# Patient Record
Sex: MALE | Employment: PART TIME | URBAN - METROPOLITAN AREA
[De-identification: names, ages, dates, MRNs, and addresses within clinical notes are randomized per-mention and may not be internally consistent; named-entity substitution may affect disease eponyms.]

---

## 2017-11-11 ENCOUNTER — APPOINTMENT (OUTPATIENT)
Dept: GENERAL RADIOLOGY | Facility: CLINIC | Age: 62
End: 2017-11-11
Attending: EMERGENCY MEDICINE
Payer: COMMERCIAL

## 2017-11-11 ENCOUNTER — HOSPITAL ENCOUNTER (EMERGENCY)
Facility: CLINIC | Age: 62
Discharge: HOME | End: 2017-11-11
Attending: EMERGENCY MEDICINE | Admitting: EMERGENCY MEDICINE
Payer: COMMERCIAL

## 2017-11-11 VITALS
SYSTOLIC BLOOD PRESSURE: 119 MMHG | DIASTOLIC BLOOD PRESSURE: 79 MMHG | RESPIRATION RATE: 18 BRPM | BODY MASS INDEX: 24.8 KG/M2 | HEIGHT: 67 IN | OXYGEN SATURATION: 99 % | WEIGHT: 158 LBS | TEMPERATURE: 97.4 F

## 2017-11-11 DIAGNOSIS — R05.9 COUGH: ICD-10-CM

## 2017-11-11 LAB
ANION GAP SERPL CALCULATED.3IONS-SCNC: 6 MMOL/L (ref 3–14)
BASOPHILS # BLD AUTO: 0.1 10E9/L (ref 0–0.2)
BASOPHILS NFR BLD AUTO: 1.1 %
BUN SERPL-MCNC: 21 MG/DL (ref 7–30)
CALCIUM SERPL-MCNC: 9.3 MG/DL (ref 8.5–10.1)
CHLORIDE SERPL-SCNC: 105 MMOL/L (ref 94–109)
CO2 SERPL-SCNC: 28 MMOL/L (ref 20–32)
CREAT SERPL-MCNC: 0.79 MG/DL (ref 0.66–1.25)
DIFFERENTIAL METHOD BLD: NORMAL
EOSINOPHIL # BLD AUTO: 0.4 10E9/L (ref 0–0.7)
EOSINOPHIL NFR BLD AUTO: 4.8 %
ERYTHROCYTE [DISTWIDTH] IN BLOOD BY AUTOMATED COUNT: 12.8 % (ref 10–15)
GFR SERPL CREATININE-BSD FRML MDRD: >90 ML/MIN/1.7M2
GLUCOSE SERPL-MCNC: 88 MG/DL (ref 70–99)
HCT VFR BLD AUTO: 42.7 % (ref 40–53)
HGB BLD-MCNC: 14.1 G/DL (ref 13.3–17.7)
IMM GRANULOCYTES # BLD: 0 10E9/L (ref 0–0.4)
IMM GRANULOCYTES NFR BLD: 0.3 %
LYMPHOCYTES # BLD AUTO: 2.2 10E9/L (ref 0.8–5.3)
LYMPHOCYTES NFR BLD AUTO: 29.3 %
MCH RBC QN AUTO: 29.1 PG (ref 26.5–33)
MCHC RBC AUTO-ENTMCNC: 33 G/DL (ref 31.5–36.5)
MCV RBC AUTO: 88 FL (ref 78–100)
MONOCYTES # BLD AUTO: 0.7 10E9/L (ref 0–1.3)
MONOCYTES NFR BLD AUTO: 8.8 %
NEUTROPHILS # BLD AUTO: 4.1 10E9/L (ref 1.6–8.3)
NEUTROPHILS NFR BLD AUTO: 55.7 %
NRBC # BLD AUTO: 0 10*3/UL
NRBC BLD AUTO-RTO: 0 /100
PLATELET # BLD AUTO: 192 10E9/L (ref 150–450)
POTASSIUM SERPL-SCNC: 4.2 MMOL/L (ref 3.4–5.3)
RBC # BLD AUTO: 4.85 10E12/L (ref 4.4–5.9)
SODIUM SERPL-SCNC: 139 MMOL/L (ref 133–144)
WBC # BLD AUTO: 7.4 10E9/L (ref 4–11)

## 2017-11-11 PROCEDURE — 85025 COMPLETE CBC W/AUTO DIFF WBC: CPT | Performed by: EMERGENCY MEDICINE

## 2017-11-11 PROCEDURE — 99284 EMERGENCY DEPT VISIT MOD MDM: CPT | Mod: 25 | Performed by: EMERGENCY MEDICINE

## 2017-11-11 PROCEDURE — 99283 EMERGENCY DEPT VISIT LOW MDM: CPT | Mod: Z6 | Performed by: EMERGENCY MEDICINE

## 2017-11-11 PROCEDURE — 80048 BASIC METABOLIC PNL TOTAL CA: CPT | Performed by: EMERGENCY MEDICINE

## 2017-11-11 PROCEDURE — 71020 XR CHEST 2 VW: CPT

## 2017-11-11 RX ORDER — IPRATROPIUM BROMIDE AND ALBUTEROL SULFATE 2.5; .5 MG/3ML; MG/3ML
SOLUTION RESPIRATORY (INHALATION)
Status: DISCONTINUED
Start: 2017-11-11 | End: 2017-11-11 | Stop reason: HOSPADM

## 2017-11-11 ASSESSMENT — ENCOUNTER SYMPTOMS
ABDOMINAL PAIN: 0
CHILLS: 1
CHEST TIGHTNESS: 1
FEVER: 0
DIARRHEA: 0
VOMITING: 0
COUGH: 1

## 2017-11-11 NOTE — ED NOTES
"Pt reports productive cough for 1 month and tested positive for strep throat 3 weeks ago. Pt denies sore throat at this time. Pt took and finished Augmentin course 1 week ago and has felt like it has been worsening with pain/discomfort in his lungs with a \"pinching pain\" on bilateral sides and continued green sputum.   "

## 2017-11-11 NOTE — ED PROVIDER NOTES
History     Chief Complaint   Patient presents with     Cold Symptoms     Pt states he has had cough x 1 mth. Pt was seen in clinic and was given azithromycin and Guaiatussin AC. Pt states he is feeling worse and not getting any better. Coughing up green sputum. C/O fever and chills     HPI  Lei Dickens is a 62 year old male who presents for evaluation of cough. The patient reports that he's had a cough ongoing for the past 6 weeks. He states that he developed a sore throat and was seen in clinic, tested positive for strep, and sore throat improved with amoxicillin. However, cough has persisted, so he was again seen in clinic 3 weeks ago and provided a course of azithromycin and Robitussin, but he has not had improvement in cough with this. The patient reports cough is productive with dark green sputum. He endorses congestion and reports that he's now getting a pinching pain in both sides of his upper back. No fevers but he's had chills at night, and decreased sleep due to his symptoms. He denies any nasal congestion or runny nose. The patient denies a history of lung problem. Also no history of heart problem, diabetes, or any other significant medical ailment. No history of TB. He's usual quite healthy, exercises regularly, though this has been limited recently due to some new shortness of breath related to his symptoms. He states he's been eating/drinking okay recently. He's a nonsmoker, does not drink alcohol or use drugs. The patient had chest x-ray last about 6 weeks ago that was reportedly normal.    Current Facility-Administered Medications   Medication     ipratropium - albuterol 0.5 mg/2.5 mg/3 mL (DUONEB) 0.5-2.5 (3) MG/3ML neb solution     Current Outpatient Prescriptions   Medication     AMOXICILLIN PO     azithromycin (ZITHROMAX) 250 MG tablet     History reviewed. No pertinent past medical history.    History reviewed. No pertinent surgical history.    No family history on  "file.    Social History   Substance Use Topics     Smoking status: Never Smoker     Smokeless tobacco: Never Used     Alcohol use No      No Known Allergies    I have reviewed the Medications, Allergies, Past Medical and Surgical History, and Social History in the Epic system.    Review of Systems   Constitutional: Positive for chills. Negative for fever.   Respiratory: Positive for cough and chest tightness.    Cardiovascular: Negative for chest pain.   Gastrointestinal: Negative for abdominal pain, diarrhea and vomiting.   All other systems reviewed and are negative.      Physical Exam   BP: 129/84  Heart Rate: 99  Temp: 97.4  F (36.3  C)  Resp: 18  Height: 170.2 cm (5' 7\")  Weight: 71.7 kg (158 lb)  SpO2: 98 %      Physical Exam   Constitutional: He is oriented to person, place, and time. He appears well-developed and well-nourished. No distress.   HENT:   Head: Normocephalic and atraumatic.   Right Ear: External ear normal.   Left Ear: External ear normal.   Nose: Nose normal.   Mouth/Throat: Oropharynx is clear and moist.   Eyes: EOM are normal. Pupils are equal, round, and reactive to light.   Neck: Normal range of motion. Neck supple.   Cardiovascular: Normal rate, regular rhythm, normal heart sounds and intact distal pulses.    Pulmonary/Chest: Effort normal and breath sounds normal. No respiratory distress. He has no wheezes. He has no rales. He exhibits no tenderness.   Speaks in full sentences.     Abdominal: Soft. Bowel sounds are normal. He exhibits no distension and no mass. There is no tenderness. There is no rebound and no guarding.   Musculoskeletal: Normal range of motion.   Walks briskly without limitation.    Lymphadenopathy:     He has no cervical adenopathy.   Neurological: He is alert and oriented to person, place, and time.   Skin: Skin is warm and dry. No rash noted. He is not diaphoretic.   Psychiatric: He has a normal mood and affect.   Nursing note and vitals reviewed.      ED Course "     ED Course     Procedures           Labs Ordered and Resulted from Time of ED Arrival Up to the Time of Departure from the ED   CBC WITH PLATELETS DIFFERENTIAL   BASIC METABOLIC PANEL     Results for orders placed or performed during the hospital encounter of 11/11/17 (from the past 24 hour(s))   XR Chest 2 Views    Narrative    CHEST TWO VIEWS 11/11/2017 1:08 PM     HISTORY: Cough and fevers.      Impression    IMPRESSION: PA and lateral views of the chest. Lungs are clear. Heart  is normal in size. No effusions are evident. No pneumothorax.    ALCIDES LAY MD   CBC with platelets differential   Result Value Ref Range    WBC 7.4 4.0 - 11.0 10e9/L    RBC Count 4.85 4.4 - 5.9 10e12/L    Hemoglobin 14.1 13.3 - 17.7 g/dL    Hematocrit 42.7 40.0 - 53.0 %    MCV 88 78 - 100 fl    MCH 29.1 26.5 - 33.0 pg    MCHC 33.0 31.5 - 36.5 g/dL    RDW 12.8 10.0 - 15.0 %    Platelet Count 192 150 - 450 10e9/L    Diff Method Automated Method     % Neutrophils 55.7 %    % Lymphocytes 29.3 %    % Monocytes 8.8 %    % Eosinophils 4.8 %    % Basophils 1.1 %    % Immature Granulocytes 0.3 %    Nucleated RBCs 0 0 /100    Absolute Neutrophil 4.1 1.6 - 8.3 10e9/L    Absolute Lymphocytes 2.2 0.8 - 5.3 10e9/L    Absolute Monocytes 0.7 0.0 - 1.3 10e9/L    Absolute Eosinophils 0.4 0.0 - 0.7 10e9/L    Absolute Basophils 0.1 0.0 - 0.2 10e9/L    Abs Immature Granulocytes 0.0 0 - 0.4 10e9/L    Absolute Nucleated RBC 0.0    Basic metabolic panel   Result Value Ref Range    Sodium 139 133 - 144 mmol/L    Potassium 4.2 3.4 - 5.3 mmol/L    Chloride 105 94 - 109 mmol/L    Carbon Dioxide 28 20 - 32 mmol/L    Anion Gap 6 3 - 14 mmol/L    Glucose 88 70 - 99 mg/dL    Urea Nitrogen 21 7 - 30 mg/dL    Creatinine 0.79 0.66 - 1.25 mg/dL    GFR Estimate >90 >60 mL/min/1.7m2    GFR Estimate If Black >90 >60 mL/min/1.7m2    Calcium 9.3 8.5 - 10.1 mg/dL            Assessments & Plan (with Medical Decision Making)   The patient presents with cough for 6 weeks.  It  sounds like it started with sore throat that was positive for strep and treated with amoxicillin.  His sore throat improved but his cough persisted.  He was treated with zpak and cough meds but cough persists.  Today his sats are 98% on room air.  He appears well. Due to duration of symptoms, cxr was done and reviewed.  No infiltrate was noted.  CBC is normal today. PE is unlikely given the green phlegm he is coughing up.  He denies sinus drainage.  We discussed this is likely still viral and he should give it a few more weeks to improve.  He should f/u with his pmd in 2 weeks if not better.     I have reviewed the nursing notes.    I have reviewed the findings, diagnosis, plan and need for follow up with the patient.    New Prescriptions    No medications on file       Final diagnoses:   Cough     Robinson KING, am serving as a trained medical scribe to document services personally performed by Sammi Esquivel MD, based on the provider's statements to me.      Sammi KING MD, was physically present and have reviewed and verified the accuracy of this note documented by Robinson Babin.    11/11/2017   [facility], [place], EMERGENCY DEPARTMENT     Sammi Esquivel MD  11/11/17 2056

## 2017-11-11 NOTE — ED AVS SNAPSHOT
[facility], [place], Emergency Department    [address]    [place] [address]    Phone:  [phone]    Fax:  [phone]                                       Lei Dickens   MRN: [id number]    Department:  [facility], Emergency Department   Date of Visit:  11/11/2017           After Visit Summary Signature Page     I have received my discharge instructions, and my questions have been answered. I have discussed any challenges I see with this plan with the nurse or doctor.    ..........................................................................................................................................  Patient/Patient Representative Signature      ..........................................................................................................................................  Patient Representative Print Name and Relationship to Patient    ..................................................               ................................................  Date                                            Time    ..........................................................................................................................................  Reviewed by Signature/Title    ...................................................              ..............................................  Date                                                            Time

## 2017-11-11 NOTE — DISCHARGE INSTRUCTIONS
Continue using cough medicine if needed.     No pneumonia was seen on your xray today.     Please follow up with your primary care doctor in 2 weeks if still not better.     Today's result:  Results for orders placed or performed during the hospital encounter of 11/11/17 (from the past 24 hour(s))   XR Chest 2 Views    Narrative    CHEST TWO VIEWS 11/11/2017 1:08 PM     HISTORY: Cough and fevers.      Impression    IMPRESSION: PA and lateral views of the chest. Lungs are clear. Heart  is normal in size. No effusions are evident. No pneumothorax.    ALCIDES LAY MD   CBC with platelets differential   Result Value Ref Range    WBC 7.4 4.0 - 11.0 10e9/L    RBC Count 4.85 4.4 - 5.9 10e12/L    Hemoglobin 14.1 13.3 - 17.7 g/dL    Hematocrit 42.7 40.0 - 53.0 %    MCV 88 78 - 100 fl    MCH 29.1 26.5 - 33.0 pg    MCHC 33.0 31.5 - 36.5 g/dL    RDW 12.8 10.0 - 15.0 %    Platelet Count 192 150 - 450 10e9/L    Diff Method Automated Method     % Neutrophils 55.7 %    % Lymphocytes 29.3 %    % Monocytes 8.8 %    % Eosinophils 4.8 %    % Basophils 1.1 %    % Immature Granulocytes 0.3 %    Nucleated RBCs 0 0 /100    Absolute Neutrophil 4.1 1.6 - 8.3 10e9/L    Absolute Lymphocytes 2.2 0.8 - 5.3 10e9/L    Absolute Monocytes 0.7 0.0 - 1.3 10e9/L    Absolute Eosinophils 0.4 0.0 - 0.7 10e9/L    Absolute Basophils 0.1 0.0 - 0.2 10e9/L    Abs Immature Granulocytes 0.0 0 - 0.4 10e9/L    Absolute Nucleated RBC 0.0    Basic metabolic panel   Result Value Ref Range    Sodium 139 133 - 144 mmol/L    Potassium 4.2 3.4 - 5.3 mmol/L    Chloride 105 94 - 109 mmol/L    Carbon Dioxide 28 20 - 32 mmol/L    Anion Gap 6 3 - 14 mmol/L    Glucose 88 70 - 99 mg/dL    Urea Nitrogen 21 7 - 30 mg/dL    Creatinine 0.79 0.66 - 1.25 mg/dL    GFR Estimate >90 >60 mL/min/1.7m2    GFR Estimate If Black >90 >60 mL/min/1.7m2    Calcium 9.3 8.5 - 10.1 mg/dL

## 2017-11-11 NOTE — ED AVS SNAPSHOT
[facility], Emergency Department    [address]    [place] [address]    Phone:  [phone]    Fax:  [phone]                                       Lei Dickens   MRN: [id number]    Department:  [facility], Emergency Department   Date of Visit:  11/11/2017           Patient Information     Date Of Birth          1955        Your diagnoses for this visit were:     Cough        You were seen by Sammi Esquivel MD.        Discharge Instructions       Continue using cough medicine if needed.     No pneumonia was seen on your xray today.     Please follow up with your primary care doctor in 2 weeks if still not better.     Today's result:  Results for orders placed or performed during the hospital encounter of 11/11/17 (from the past 24 hour(s))   XR Chest 2 Views    Narrative    CHEST TWO VIEWS 11/11/2017 1:08 PM     HISTORY: Cough and fevers.      Impression    IMPRESSION: PA and lateral views of the chest. Lungs are clear. Heart  is normal in size. No effusions are evident. No pneumothorax.    ALCIDES LAY MD   CBC with platelets differential   Result Value Ref Range    WBC 7.4 4.0 - 11.0 10e9/L    RBC Count 4.85 4.4 - 5.9 10e12/L    Hemoglobin 14.1 13.3 - 17.7 g/dL    Hematocrit 42.7 40.0 - 53.0 %    MCV 88 78 - 100 fl    MCH 29.1 26.5 - 33.0 pg    MCHC 33.0 31.5 - 36.5 g/dL    RDW 12.8 10.0 - 15.0 %    Platelet Count 192 150 - 450 10e9/L    Diff Method Automated Method     % Neutrophils 55.7 %    % Lymphocytes 29.3 %    % Monocytes 8.8 %    % Eosinophils 4.8 %    % Basophils 1.1 %    % Immature Granulocytes 0.3 %    Nucleated RBCs 0 0 /100    Absolute Neutrophil 4.1 1.6 - 8.3 10e9/L    Absolute Lymphocytes 2.2 0.8 - 5.3 10e9/L    Absolute Monocytes 0.7 0.0 - 1.3 10e9/L    Absolute Eosinophils 0.4 0.0 - 0.7 10e9/L    Absolute Basophils 0.1 0.0 - 0.2 10e9/L    Abs Immature Granulocytes 0.0 0 - 0.4 10e9/L    Absolute Nucleated RBC 0.0    Basic metabolic panel   Result Value Ref  Range    Sodium 139 133 - 144 mmol/L    Potassium 4.2 3.4 - 5.3 mmol/L    Chloride 105 94 - 109 mmol/L    Carbon Dioxide 28 20 - 32 mmol/L    Anion Gap 6 3 - 14 mmol/L    Glucose 88 70 - 99 mg/dL    Urea Nitrogen 21 7 - 30 mg/dL    Creatinine 0.79 0.66 - 1.25 mg/dL    GFR Estimate >90 >60 mL/min/1.7m2    GFR Estimate If Black >90 >60 mL/min/1.7m2    Calcium 9.3 8.5 - 10.1 mg/dL         24 Hour Appointment Hotline       To make an appointment at any [facility] clinic, call [phone] ([phone]). If you don't have a family doctor or clinic, we will help you find one. [facility] clinics are conveniently located to serve the needs of you and your family.             Review of your medicines      Our records show that you are taking the medicines listed below. If these are incorrect, please call your family doctor or clinic.        Dose / Directions Last dose taken    AMOXICILLIN PO        Refills:  0        azithromycin 250 MG tablet   Commonly known as:  ZITHROMAX   Quantity:  6 tablet        Two tablets first day, then one tablet daily for four days   Refills:  0                Procedures and tests performed during your visit     Basic metabolic panel    CBC with platelets differential    XR Chest 2 Views      Orders Needing Specimen Collection     None      Pending Results     No orders found from 11/9/2017 to 11/12/2017.            Pending Culture Results     No orders found from 11/9/2017 to 11/12/2017.            Pending Results Instructions     If you had any lab results that were not finalized at the time of your Discharge, you can call the ED Lab Result RN at [phone]. You will be contacted by this team for any positive Lab results or changes in treatment. The nurses are available 7 days a week from 10A to 6:30P.  You can leave a message 24 hours per day and they will return your call.        Thank you for choosing [facility]       Thank you for choosing [facility] for your care. Our goal is always to  "provide you with excellent care. Hearing back from our patients is one way we can continue to improve our services. Please take a few minutes to complete the written survey that you may receive in the mail after you visit with us. Thank you!        [employer] Information     [employer] lets you send messages to your doctor, view your test results, renew your prescriptions, schedule appointments and more. To sign up, go to [email or web address] . Click on \"Log in\" on the left side of the screen, which will take you to the Welcome page. Then click on \"Sign up Now\" on the right side of the page.     You will be asked to enter the access code listed below, as well as some personal information. Please follow the directions to create your username and password.     Your access code is: [email or web address]  Expires: 2018  2:50 PM     Your access code will  in 90 days. If you need help or a new code, please call your [facility] clinic or [phone].        Care EveryWhere ID     This is your Care EveryWhere ID. This could be used by other organizations to access your [facility] medical records  [id number]        Equal Access to Services     [place] AH: Hadii mo Tenorio, watrinityda rafa, qaybta kaalmada hanh, rhianna parikh. So [facility] [phone].    ATENCIÓN: Si habla español, tiene a soler disposición servicios gratuitos de asistencia lingüística. Tamara al [phone].    We comply with applicable federal civil rights laws and [place] laws. We do not discriminate on the basis of race, color, national origin, age, disability, sex, sexual orientation, or gender identity.            After Visit Summary       This is your record. Keep this with you and show to your community pharmacist(s) and doctor(s) at your next visit.                  "

## 2020-01-11 ENCOUNTER — HOSPITAL ENCOUNTER (OUTPATIENT)
Facility: CLINIC | Age: 65
Setting detail: OBSERVATION
Discharge: HOME | End: 2020-01-12
Attending: EMERGENCY MEDICINE | Admitting: EMERGENCY MEDICINE
Payer: COMMERCIAL

## 2020-01-11 DIAGNOSIS — R07.9 CHEST PAIN, UNSPECIFIED TYPE: ICD-10-CM

## 2020-01-11 DIAGNOSIS — E78.5 HYPERLIPIDEMIA, UNSPECIFIED HYPERLIPIDEMIA TYPE: ICD-10-CM

## 2020-01-11 DIAGNOSIS — I10 ESSENTIAL HYPERTENSION, MALIGNANT: ICD-10-CM

## 2020-01-11 DIAGNOSIS — E11.9 TYPE 2 DIABETES MELLITUS WITHOUT COMPLICATION, UNSPECIFIED WHETHER LONG TERM INSULIN USE (H): ICD-10-CM

## 2020-01-11 LAB
ALBUMIN SERPL-MCNC: 4 G/DL (ref 3.4–5)
ALP SERPL-CCNC: 81 U/L (ref 40–150)
ALT SERPL W P-5'-P-CCNC: 34 U/L (ref 0–70)
ANION GAP SERPL CALCULATED.3IONS-SCNC: 8 MMOL/L (ref 3–14)
AST SERPL W P-5'-P-CCNC: 28 U/L (ref 0–45)
BASOPHILS # BLD AUTO: 0.1 10E9/L (ref 0–0.2)
BASOPHILS NFR BLD AUTO: 0.7 %
BILIRUB SERPL-MCNC: 0.6 MG/DL (ref 0.2–1.3)
BUN SERPL-MCNC: 16 MG/DL (ref 7–30)
CALCIUM SERPL-MCNC: 8 MG/DL (ref 8.5–10.1)
CHLORIDE SERPL-SCNC: 105 MMOL/L (ref 94–109)
CO2 SERPL-SCNC: 28 MMOL/L (ref 20–32)
CREAT SERPL-MCNC: 0.73 MG/DL (ref 0.66–1.25)
DIFFERENTIAL METHOD BLD: NORMAL
EOSINOPHIL # BLD AUTO: 0.4 10E9/L (ref 0–0.7)
EOSINOPHIL NFR BLD AUTO: 6 %
ERYTHROCYTE [DISTWIDTH] IN BLOOD BY AUTOMATED COUNT: 14 % (ref 10–15)
GFR SERPL CREATININE-BSD FRML MDRD: >90 ML/MIN/{1.73_M2}
GLUCOSE SERPL-MCNC: 126 MG/DL (ref 70–99)
HCT VFR BLD AUTO: 41.8 % (ref 40–53)
HGB BLD-MCNC: 14.3 G/DL (ref 13.3–17.7)
IMM GRANULOCYTES # BLD: 0 10E9/L (ref 0–0.4)
IMM GRANULOCYTES NFR BLD: 0.4 %
LYMPHOCYTES # BLD AUTO: 2.5 10E9/L (ref 0.8–5.3)
LYMPHOCYTES NFR BLD AUTO: 33.9 %
MCH RBC QN AUTO: 30 PG (ref 26.5–33)
MCHC RBC AUTO-ENTMCNC: 34.2 G/DL (ref 31.5–36.5)
MCV RBC AUTO: 88 FL (ref 78–100)
MONOCYTES # BLD AUTO: 0.6 10E9/L (ref 0–1.3)
MONOCYTES NFR BLD AUTO: 8 %
NEUTROPHILS # BLD AUTO: 3.7 10E9/L (ref 1.6–8.3)
NEUTROPHILS NFR BLD AUTO: 51 %
NRBC # BLD AUTO: 0 10*3/UL
NRBC BLD AUTO-RTO: 0 /100
PLATELET # BLD AUTO: 177 10E9/L (ref 150–450)
POTASSIUM SERPL-SCNC: 3.5 MMOL/L (ref 3.4–5.3)
PROT SERPL-MCNC: 7.8 G/DL (ref 6.8–8.8)
RBC # BLD AUTO: 4.77 10E12/L (ref 4.4–5.9)
SODIUM SERPL-SCNC: 141 MMOL/L (ref 133–144)
TROPONIN I SERPL-MCNC: <0.015 UG/L (ref 0–0.04)
WBC # BLD AUTO: 7.2 10E9/L (ref 4–11)

## 2020-01-11 PROCEDURE — 80053 COMPREHEN METABOLIC PANEL: CPT | Performed by: EMERGENCY MEDICINE

## 2020-01-11 PROCEDURE — 99285 EMERGENCY DEPT VISIT HI MDM: CPT | Mod: 25 | Performed by: EMERGENCY MEDICINE

## 2020-01-11 PROCEDURE — 85025 COMPLETE CBC W/AUTO DIFF WBC: CPT | Performed by: EMERGENCY MEDICINE

## 2020-01-11 PROCEDURE — 93005 ELECTROCARDIOGRAM TRACING: CPT | Performed by: EMERGENCY MEDICINE

## 2020-01-11 PROCEDURE — 93010 ELECTROCARDIOGRAM REPORT: CPT | Mod: Z6 | Performed by: EMERGENCY MEDICINE

## 2020-01-11 PROCEDURE — 84484 ASSAY OF TROPONIN QUANT: CPT | Performed by: EMERGENCY MEDICINE

## 2020-01-11 RX ORDER — MELATONIN 5 MG
5 TABLET,CHEWABLE ORAL PRN
COMMUNITY
Start: 2020-01-09

## 2020-01-11 RX ORDER — ATORVASTATIN CALCIUM 20 MG/1
20 TABLET, FILM COATED ORAL DAILY
COMMUNITY
Start: 2020-01-09 | End: 2021-01-08

## 2020-01-11 RX ORDER — LISINOPRIL 10 MG/1
10 TABLET ORAL DAILY
COMMUNITY
Start: 2020-01-10 | End: 2021-01-09

## 2020-01-11 RX ORDER — METFORMIN HCL 500 MG
500 TABLET, EXTENDED RELEASE 24 HR ORAL DAILY
COMMUNITY
Start: 2020-01-09 | End: 2021-01-08

## 2020-01-11 ASSESSMENT — ENCOUNTER SYMPTOMS
HYPERTENSION: 1
HEADACHES: 1

## 2020-01-12 ENCOUNTER — APPOINTMENT (OUTPATIENT)
Dept: GENERAL RADIOLOGY | Facility: CLINIC | Age: 65
End: 2020-01-12
Attending: EMERGENCY MEDICINE
Payer: COMMERCIAL

## 2020-01-12 ENCOUNTER — APPOINTMENT (OUTPATIENT)
Dept: CARDIOLOGY | Facility: CLINIC | Age: 65
End: 2020-01-12
Attending: NURSE PRACTITIONER
Payer: COMMERCIAL

## 2020-01-12 VITALS
OXYGEN SATURATION: 99 % | BODY MASS INDEX: 26.66 KG/M2 | RESPIRATION RATE: 16 BRPM | HEART RATE: 57 BPM | WEIGHT: 170.19 LBS | DIASTOLIC BLOOD PRESSURE: 94 MMHG | SYSTOLIC BLOOD PRESSURE: 140 MMHG | TEMPERATURE: 98.1 F

## 2020-01-12 LAB
GLUCOSE BLDC GLUCOMTR-MCNC: 105 MG/DL (ref 70–99)
GLUCOSE BLDC GLUCOMTR-MCNC: 107 MG/DL (ref 70–99)
TROPONIN I SERPL-MCNC: <0.015 UG/L (ref 0–0.04)
TROPONIN I SERPL-MCNC: <0.015 UG/L (ref 0–0.04)

## 2020-01-12 PROCEDURE — G0378 HOSPITAL OBSERVATION PER HR: HCPCS

## 2020-01-12 PROCEDURE — 25000128 H RX IP 250 OP 636: Performed by: EMERGENCY MEDICINE

## 2020-01-12 PROCEDURE — 36415 COLL VENOUS BLD VENIPUNCTURE: CPT | Performed by: NURSE PRACTITIONER

## 2020-01-12 PROCEDURE — 93321 DOPPLER ECHO F-UP/LMTD STD: CPT | Mod: 26 | Performed by: INTERNAL MEDICINE

## 2020-01-12 PROCEDURE — 00000146 ZZHCL STATISTIC GLUCOSE BY METER IP

## 2020-01-12 PROCEDURE — 93016 CV STRESS TEST SUPVJ ONLY: CPT | Performed by: INTERNAL MEDICINE

## 2020-01-12 PROCEDURE — 25500064 ZZH RX 255 OP 636: Performed by: EMERGENCY MEDICINE

## 2020-01-12 PROCEDURE — 99220 ZZC INITIAL OBSERVATION CARE,LEVL III: CPT | Mod: Z6 | Performed by: NURSE PRACTITIONER

## 2020-01-12 PROCEDURE — 84484 ASSAY OF TROPONIN QUANT: CPT | Mod: 91 | Performed by: NURSE PRACTITIONER

## 2020-01-12 PROCEDURE — 93350 STRESS TTE ONLY: CPT | Mod: 26 | Performed by: INTERNAL MEDICINE

## 2020-01-12 PROCEDURE — 40000802 ZZH SITE CHECK

## 2020-01-12 PROCEDURE — 71046 X-RAY EXAM CHEST 2 VIEWS: CPT

## 2020-01-12 PROCEDURE — 25000132 ZZH RX MED GY IP 250 OP 250 PS 637: Performed by: NURSE PRACTITIONER

## 2020-01-12 PROCEDURE — 25000125 ZZHC RX 250: Performed by: EMERGENCY MEDICINE

## 2020-01-12 PROCEDURE — 93018 CV STRESS TEST I&R ONLY: CPT | Performed by: INTERNAL MEDICINE

## 2020-01-12 PROCEDURE — 93325 DOPPLER ECHO COLOR FLOW MAPG: CPT | Mod: 26 | Performed by: INTERNAL MEDICINE

## 2020-01-12 PROCEDURE — 93325 DOPPLER ECHO COLOR FLOW MAPG: CPT | Mod: TC

## 2020-01-12 RX ORDER — ALUMINA, MAGNESIA, AND SIMETHICONE 2400; 2400; 240 MG/30ML; MG/30ML; MG/30ML
30 SUSPENSION ORAL EVERY 4 HOURS PRN
Status: DISCONTINUED | OUTPATIENT
Start: 2020-01-12 | End: 2020-01-12 | Stop reason: HOSPADM

## 2020-01-12 RX ORDER — NALOXONE HYDROCHLORIDE 0.4 MG/ML
.1-.4 INJECTION, SOLUTION INTRAMUSCULAR; INTRAVENOUS; SUBCUTANEOUS
Status: DISCONTINUED | OUTPATIENT
Start: 2020-01-12 | End: 2020-01-12 | Stop reason: HOSPADM

## 2020-01-12 RX ORDER — DOBUTAMINE HYDROCHLORIDE 200 MG/100ML
10-50 INJECTION INTRAVENOUS CONTINUOUS
Status: DISPENSED | OUTPATIENT
Start: 2020-01-12 | End: 2020-01-12

## 2020-01-12 RX ORDER — NITROGLYCERIN 0.4 MG/1
0.4 TABLET SUBLINGUAL EVERY 5 MIN PRN
Status: DISCONTINUED | OUTPATIENT
Start: 2020-01-12 | End: 2020-01-12 | Stop reason: HOSPADM

## 2020-01-12 RX ORDER — ACETAMINOPHEN 650 MG/1
650 SUPPOSITORY RECTAL EVERY 4 HOURS PRN
Status: DISCONTINUED | OUTPATIENT
Start: 2020-01-12 | End: 2020-01-12 | Stop reason: HOSPADM

## 2020-01-12 RX ORDER — ATROPINE SULFATE 0.4 MG/ML
.2-2 AMPUL (ML) INJECTION
Status: COMPLETED | OUTPATIENT
Start: 2020-01-12 | End: 2020-01-12

## 2020-01-12 RX ORDER — ATORVASTATIN CALCIUM 20 MG/1
20 TABLET, FILM COATED ORAL DAILY
Status: DISCONTINUED | OUTPATIENT
Start: 2020-01-12 | End: 2020-01-12 | Stop reason: HOSPADM

## 2020-01-12 RX ORDER — ACETAMINOPHEN 325 MG/1
650 TABLET ORAL EVERY 4 HOURS PRN
Status: DISCONTINUED | OUTPATIENT
Start: 2020-01-12 | End: 2020-01-12 | Stop reason: HOSPADM

## 2020-01-12 RX ORDER — METOPROLOL TARTRATE 1 MG/ML
1-20 INJECTION, SOLUTION INTRAVENOUS
Status: ACTIVE | OUTPATIENT
Start: 2020-01-12 | End: 2020-01-12

## 2020-01-12 RX ORDER — SODIUM CHLORIDE 9 MG/ML
INJECTION, SOLUTION INTRAVENOUS CONTINUOUS
Status: ACTIVE | OUTPATIENT
Start: 2020-01-12 | End: 2020-01-12

## 2020-01-12 RX ORDER — LISINOPRIL 10 MG/1
10 TABLET ORAL DAILY
Status: DISCONTINUED | OUTPATIENT
Start: 2020-01-12 | End: 2020-01-12 | Stop reason: HOSPADM

## 2020-01-12 RX ORDER — DEXTROSE MONOHYDRATE 25 G/50ML
25-50 INJECTION, SOLUTION INTRAVENOUS
Status: DISCONTINUED | OUTPATIENT
Start: 2020-01-12 | End: 2020-01-12 | Stop reason: HOSPADM

## 2020-01-12 RX ORDER — NICOTINE POLACRILEX 4 MG
15-30 LOZENGE BUCCAL
Status: DISCONTINUED | OUTPATIENT
Start: 2020-01-12 | End: 2020-01-12 | Stop reason: HOSPADM

## 2020-01-12 RX ORDER — ASPIRIN 81 MG/1
162 TABLET, CHEWABLE ORAL ONCE
Status: COMPLETED | OUTPATIENT
Start: 2020-01-12 | End: 2020-01-12

## 2020-01-12 RX ORDER — ASPIRIN 81 MG/1
81 TABLET ORAL DAILY
Status: DISCONTINUED | OUTPATIENT
Start: 2020-01-12 | End: 2020-01-12 | Stop reason: HOSPADM

## 2020-01-12 RX ORDER — HYDRALAZINE HYDROCHLORIDE 20 MG/ML
5 INJECTION INTRAMUSCULAR; INTRAVENOUS EVERY 4 HOURS PRN
Status: DISCONTINUED | OUTPATIENT
Start: 2020-01-12 | End: 2020-01-12 | Stop reason: HOSPADM

## 2020-01-12 RX ORDER — LIDOCAINE 40 MG/G
CREAM TOPICAL
Status: DISCONTINUED | OUTPATIENT
Start: 2020-01-12 | End: 2020-01-12 | Stop reason: HOSPADM

## 2020-01-12 RX ADMIN — ASPIRIN 81 MG: 81 TABLET, COATED ORAL at 08:18

## 2020-01-12 RX ADMIN — LISINOPRIL 10 MG: 10 TABLET ORAL at 08:18

## 2020-01-12 RX ADMIN — PERFLUTREN 9 ML: 6.52 INJECTION, SUSPENSION INTRAVENOUS at 11:23

## 2020-01-12 RX ADMIN — ATROPINE SULFATE 0.4 MG: 0.4 INJECTION, SOLUTION INTRAMUSCULAR; INTRAVENOUS; SUBCUTANEOUS at 11:16

## 2020-01-12 RX ADMIN — METOPROLOL TARTRATE 3 MG: 5 INJECTION INTRAVENOUS at 11:20

## 2020-01-12 RX ADMIN — DOBUTAMINE HYDROCHLORIDE 10 MCG/KG/MIN: 200 INJECTION INTRAVENOUS at 11:08

## 2020-01-12 RX ADMIN — ASPIRIN 81 MG CHEWABLE TABLET 162 MG: 81 TABLET CHEWABLE at 03:41

## 2020-01-12 RX ADMIN — ATORVASTATIN CALCIUM 20 MG: 20 TABLET, FILM COATED ORAL at 08:18

## 2020-01-12 NOTE — ED PROVIDER NOTES
"    [facility] EMERGENCY DEPARTMENT ([facility])     January 11, 2020  History     Chief Complaint   Patient presents with     Hypertension     has been to doctor for high blood pressure, today it is really bad. He has had some chest pain with this high blood pressure and today his chest pain seemed a bit worse.      The history is provided by the patient and medical records.   Hypertension   Associated symptoms: chest pain (Sharp) and headaches      Lei Dickens is a 64 year old male with a past medical history notable for hypertension, hypercholesterolemia, and prediabetes who presents to the Emergency Department for complaints of chest pain and high blood pressure.  Patient states he was seen by his PCP earlier this week where he was told that he has high blood pressure and high cholesterol.  States he was prescribed BP medication, high cholesterol medication, and prediabetes medication.  Today, patient states he took his BP medication this morning at 8 AM noting, his BP was at 140. He reports of checking his BP throughout the day about 10 times where it has been increasing.  States when he last checked his BP today, it was at 180/100 which was worse than is ever been.    Patient reports of having chest pain earlier today as well with quality of pain as \"sharp\".  Per patient, states his chest pain has subsided and denies pain here in the ED.  States when he had chest pain, it lasted about 3 to 4 minutes.  He endorses having headache as well.  He denies history of stress test.    Past Medical History:   Diagnosis Date     Hypercholesterolemia      Hypertension      Pre-diabetes      History reviewed. No pertinent surgical history.    No family history on file.    Social History     Tobacco Use     Smoking status: Never Smoker     Smokeless tobacco: Never Used   Substance Use Topics     Alcohol use: No     No current facility-administered medications for this encounter.      Current Outpatient " Medications   Medication     atorvastatin (LIPITOR) 20 MG tablet     lisinopril (PRINIVIL/ZESTRIL) 10 MG tablet     magnesium hydroxide (MILK OF MAGNESIA) 400 MG/5ML suspension     Melatonin 5 MG CHEW     metFORMIN (GLUCOPHAGE-XR) 500 MG 24 hr tablet      No Known Allergies    I have reviewed the Medications, Allergies, Past Medical and Surgical History, and Social History in the Epic system.    Review of Systems   Cardiovascular: Positive for chest pain (Sharp).   Neurological: Positive for headaches.   All other systems reviewed and are negative.      Physical Exam   BP: (!) 180/95  Pulse: 66  Heart Rate: 62  Temp: 96.7  F (35.9  C)  Resp: 16  Weight: 78.5 kg (173 lb)  SpO2: 100 %      Physical Exam  Vitals signs and nursing note reviewed.   Constitutional:       General: He is not in acute distress.     Appearance: He is well-developed. He is not ill-appearing, toxic-appearing or diaphoretic.   HENT:      Head: Normocephalic and atraumatic.      Mouth/Throat:      Lips: Pink.      Mouth: Mucous membranes are moist.      Pharynx: Oropharynx is clear. No oropharyngeal exudate.   Eyes:      General: Lids are normal. No scleral icterus.     Extraocular Movements: Extraocular movements intact.      Right eye: No nystagmus.      Left eye: No nystagmus.      Conjunctiva/sclera: Conjunctivae normal.      Pupils: Pupils are equal, round, and reactive to light.   Neck:      Musculoskeletal: Normal range of motion and neck supple. No erythema or neck rigidity.      Thyroid: No thyromegaly.      Vascular: No JVD.      Trachea: No tracheal deviation.   Cardiovascular:      Rate and Rhythm: Normal rate and regular rhythm.      Pulses: Normal pulses.      Heart sounds: Normal heart sounds. No murmur. No friction rub. No gallop.    Pulmonary:      Effort: Pulmonary effort is normal. No respiratory distress.      Breath sounds: Normal breath sounds.   Abdominal:      General: Bowel sounds are normal. There is no distension.       Palpations: Abdomen is soft. There is no mass.      Tenderness: There is no abdominal tenderness. There is no guarding or rebound.   Musculoskeletal: Normal range of motion.         General: No tenderness.      Right lower leg: No edema.      Left lower leg: No edema.   Lymphadenopathy:      Cervical: No cervical adenopathy.   Skin:     General: Skin is warm and dry.      Capillary Refill: Capillary refill takes less than 2 seconds.      Coloration: Skin is not pale.      Findings: No erythema or rash.   Neurological:      Mental Status: He is alert and oriented to person, place, and time.      Cranial Nerves: No cranial nerve deficit.      Sensory: No sensory deficit.      Motor: Motor function is intact.   Psychiatric:         Mood and Affect: Mood and affect normal.         Speech: Speech normal.         Behavior: Behavior normal.         ED Course   10:53 PM  The patient was seen and examined by Moise Rivas MD, in Room ED02.      Procedures             EKG Interpretation:      Interpreted by Moise Rivas MD    Symptoms at time of EKG: chest pain   Rhythm: normal sinus   Rate: normal  Axis: normal  Ectopy: none  Conduction: normal  ST Segments/ T Waves: No ST-T wave changes  Q Waves: none  Comparison to prior: No old EKG available    Clinical Impression: normal EKG          Labs Ordered and Resulted from Time of ED Arrival Up to the Time of Departure from the ED   COMPREHENSIVE METABOLIC PANEL - Abnormal; Notable for the following components:       Result Value    Glucose 126 (*)     Calcium 8.0 (*)     All other components within normal limits   CBC WITH PLATELETS DIFFERENTIAL   TROPONIN I   PERIPHERAL IV CATHETER     Dobutamine Stress Echocardiogram   Final Result      XR Chest 2 Views   Final Result   IMPRESSION: No evidence of active cardiopulmonary disease.                Assessments & Plan (with Medical Decision Making)   This patient presented to the Emergency Department complaining of  high blood pressure as well as intermittent chest pains for the past couple days.  He states his pain was most severe today at which point he also noticed that his blood pressure was quite elevated in the 180s systolic.  At present, patient reports that he has minimal pain if any.  His twelve-lead EKG demonstrates no evidence of acute ischemia.  His initial troponin is negative and chest x-ray showed no signs of congestive heart failure, cardiomegaly or other acute abnormality.  I have no clinical suspicion of pulmonary embolism given his vacillating symptoms, lack of hypoxia, and lack of other risk factors.  He does have some classic cardiac risk factors and has not had formal cardiac stress testing so I do feel that given his symptom constellation and risk factor profile he would benefit from a chest pain observation stay.  This was discussed with the patient and he was in agreement with the plan.  I spoke with the NEREYDA in the observation unit and the patient will be admitted to the observation unit for chest pain rule out protocol.    This part of the medical record was transcribed by Ant Nuno Medical Scribe, from a dictation done by Moise Rivas MD.     I have reviewed the nursing notes.    I have reviewed the findings, diagnosis, plan and need for follow up with the patient.    Discharge Medication List as of 1/12/2020 12:50 PM          Final diagnoses:   Chest pain, unspecified type   I, Ant Nuno am serving as a trained medical scribe to document services personally performed by Moise Rivas MD, based on the provider's statements to me.      I, Moise Rivas MD, was physically present and have reviewed and verified the accuracy of this note documented by Ant Nuno.     1/11/2020   [facility], [place], EMERGENCY DEPARTMENT     Moise Rivas MD  01/12/20 1934

## 2020-01-12 NOTE — H&P
[facility], [place]    History and Physical - ED Observation       Date of Admission:  1/11/2020    Assessment & Plan   Lei Dickens is a 64 year old male with a past medical history notable for hypertension, hypercholesterolemia, and prediabetes who presents to the Emergency Department for complaints of chest pain and high blood pressure.     #Chest pain  #Hypertension  Pt reports intermittent chest pain for the last few days. The chest pain is sharp and lasts for 3-4 minutes at a time. He also notes that his blood pressure has been up all day today, at one point as high as 180 systolic. He decided to present to the Emergency Dept. He is not short of breath or hypoxic. He denies diaphoresis, or nausea/vomiting. In the ED, /95, HR 66, temp 96.7, RR 16, SPO2 100% on RA. Labs show Na 141, K+ 3.5, Cr 0.73, BUN 16. Troponin negative. Glucose 126. WBC 7.2, Hgb 14.3, hematocrit 41.8. Chest xray shows No evidence of active cardiopulmonary disease. EKG is non-ischemic although there is a small Q wave in Lead III and aVF. Chest pain resolved in the ED. Patient has multiple cardiac risk factors, including uncontrolled hypertension, hyperlipidemia, and diabetes. Pt has never undergone a stress test. Plan: Admit to ED Observation for ACS rule out.   -[place] to ED Observation  -VS Q4hrs  -Continuous cardiac monitoring  -Troponin x2 more  -Dobutamine stress test    #DM2: Pt reports he took metformin for a while then his Hgb A1c was 5.1 so his primary care provider let him stop taking it. But his most recent visit in the last week revealed that his Hgb A1c is up above 6 again. He was restarted on metformin. Hold for now as pt is NPO. Glucose checks and MSSI ordered.  #HTN: continue PTA lisinopril  #HLD: continue PTA simvastatin     Diet: Combination Diet Clear Liquid; No Caffeine Diet    DVT Prophylaxis: Low Risk/Ambulatory with no VTE prophylaxis indicated  Santos Catheter: not  "present  Code Status: Full Code      Disposition Plan   Expected discharge: Today, recommended to prior living arrangement once cardiac workup complete.  Entered: Akosua Caban CNP 01/12/2020, 3:28 AM       Akosua Caban CNP  [facility], [place]  ED Observation, 6D  Ascom:[id number]  ______________________________________________________________________    Chief Complaint   Chest pain, high blood pressure    History is obtained from the patient  And review of the medical record    History of Present Illness   Per ED,\"Lei Dickens is a 64 year old male with a past medical history notable for hypertension, hypercholesterolemia, and prediabetes who presents to the Emergency Department for complaints of chest pain and high blood pressure.  Patient states he was seen by his PCP earlier this week where he was told that he has high blood pressure and high cholesterol.  States he was prescribed BP medication, high cholesterol medication, and prediabetes medication.  Today, patient states he took his BP medication this morning at 8 AM noting, his BP was at 140. He reports of checking his BP throughout the day about 10 times where it has been increasing.  States when he last checked his BP today, it was at 180/100 which was worse than is ever been.    Patient reports of having chest pain earlier today as well with quality of pain as \"sharp\".  Per patient, states his chest pain has subsided and denies pain here in the ED.  States when he had chest pain, it lasted about 3 to 4 minutes.  He endorses having headache as well.  He denies history of stress test.\"    Review of Systems    Cardiovascular: Positive for chest pain (Sharp).   Neurological: Positive for headaches.   All other systems reviewed and are negative.    Past Medical History    I have reviewed this patient's medical history and updated it with pertinent information if needed.   Past Medical History:   Diagnosis Date     " Hypercholesterolemia      Hypertension      Pre-diabetes        Past Surgical History   I have reviewed this patient's surgical history and updated it with pertinent information if needed.  History reviewed. No pertinent surgical history.    Social History   I have reviewed this patient's social history and updated it with pertinent information if needed.  Social History     Tobacco Use     Smoking status: Never Smoker     Smokeless tobacco: Never Used   Substance Use Topics     Alcohol use: No     Drug use: No       Family History   I have reviewed this patient's family history and updated it with pertinent information if needed.   No family history on file.    Prior to Admission Medications   Prior to Admission Medications   Prescriptions Last Dose Informant Patient Reported? Taking?   Melatonin 5 MG CHEW Past Week at Unknown time  Yes Yes   Si mg by Oral or Feeding Tube route as needed   atorvastatin (LIPITOR) 20 MG tablet 2020 at Unknown time  Yes Yes   Sig: Take 20 mg by mouth daily   lisinopril (PRINIVIL/ZESTRIL) 10 MG tablet 2020 at Unknown time  Yes Yes   Sig: Take 10 mg by mouth daily   magnesium hydroxide (MILK OF MAGNESIA) 400 MG/5ML suspension   Yes Yes   Sig: Take 15 mLs by mouth daily   metFORMIN (GLUCOPHAGE-XR) 500 MG 24 hr tablet 2020 at Unknown time  Yes Yes   Sig: Take 500 mg by mouth daily      Facility-Administered Medications: None     Allergies   No Known Allergies    Physical Exam   Vital Signs: Temp: 98  F (36.7  C) Temp src: Oral BP: (!) 148/96 Pulse: 59 Heart Rate: 59 Resp: 16 SpO2: 98 % O2 Device: None (Room air)    Weight: 170 lbs 3.12 oz    Constitutional: awake, alert, cooperative, no apparent distress, and appears stated age  Eyes: Lids and lashes normal, pupils equal, round and reactive to light, extra ocular muscles intact, sclera clear, conjunctiva normal  ENT: Normocephalic, atraumatic,  external ears without lesions, oral pharynx with moist mucous membranes,  gums normal and good dentition.  Respiratory: Clear to auscultation bilaterally, no crackles or wheezing  Cardiovascular: Regular rate and rhythm, normal S1 and S2, no S3 or S4, and no murmur noted  Abdomen: Normal bowel sounds, soft, non-distended, non-tender  Skin: normal skin color, texture, turgor  Musculoskeletal: There is no redness, warmth, or swelling of the joints.   Neurologic: Awake, alert, oriented to name, place and time.  Cranial nerves II-XII are grossly intact.     Data       Recent Labs   Lab 01/11/20  2319   WBC 7.2   HGB 14.3   MCV 88         POTASSIUM 3.5   CHLORIDE 105   CO2 28   BUN 16   CR 0.73   ANIONGAP 8   CARY 8.0*   *   ALBUMIN 4.0   PROTTOTAL 7.8   BILITOTAL 0.6   ALKPHOS 81   ALT 34   AST 28   TROPI <0.015     Recent Results (from the past 24 hour(s))   XR Chest 2 Views    Narrative    EXAM: CHEST 2 VIEWS  LOCATION: [facility]  DATE/TIME: 1/12/2020 12:01 AM    INDICATION: Chest pain.  COMPARISON: 11/11/2017.    FINDINGS: The lungs are clear. Normal size cardiac silhouette.      Impression    IMPRESSION: No evidence of active cardiopulmonary disease.          Patient was seen and evaluated by ER Staff during the OBS Unit stay (see separate note).  The medical decision making and plan of care was discussed with the OBS Care Team and was supervised by ER Staff.  The documentation above accurately reflects the patient's initial evaluation, care and disposition under my supervision in the OBS Unit.    Oneil Munoz MD, FACEP  [facility] Staff Emergency Physician

## 2020-01-12 NOTE — DISCHARGE SUMMARY
ED Observation Discharge Summary    Lei Dickens   MRN# [id number]  Age: 64 year old   YOB: 1955            Date of Admission:  1/11/2020    Date of Discharge:  1/12/2020  Admitting Physician:  Moise Rivas MD  Discharge Physician: Dr. Sanjeev MD  NP/PA: Miley La CNP        DISCHARGE DIAGNOSIS:   1. Chest Pain; resolved    INTERVAL HISTORY: VSS, afebrile. Up ad jerome. Eating and drinking ok. No further chest pain. Feels ready to discharge to home. Blood pressure improved. Will keep a journal and follow-up with his PCP.     PHYSICAL EXAM:   Blood pressure 126/83, pulse 57, temperature 97.9  F (36.6  C), temperature source Oral, resp. rate 16, weight 77.2 kg (170 lb 3.1 oz), SpO2 100 %.     GENERAL APPEARENCE: A/O x4. NAD.  SKIN: Clean, dry, and intact without visible lesions, rash, jaundice, cyanosis, erythema, ecchymoses to exposed areas. No hair or nail changes.  HEENT/NECK: NCAT w/out masses, lesions, or abnormalities. Sclera anicteric, PERRLA, EOMI.  Oral mucosa pink and moist without erythema, exudate, lesions, ulcerations, or thrush. Teeth and gums normal. Neck supple, no masses. No jugular venous distention.   CARDIOVASCULAR: S1, S2 RRR. No murmurs, rubs, or gallops.   RESPIRATORY: Respiratory effort WNL. CTA  bilaterally without crackles/rales/wheeze   GI: Active BS in all 4 quadrants. Abdomen soft and non-tender. No masses or hepatosplenomegaly.  : Deferred  MUSCULOSKELETAL:  Extremities normal, no gross deformities noted, non-tender to palpation.   PV: 2+ bilateral radial and pedal pulses. No edema noted.   NEURO: CN II-XII grossly intact. Speech normal. Appropriate throughout interview.   HEME/LYMPH: No visible bleeding.  PSYCHIATRIC: Mentation and affect appear normal  VASCULAR ACCESS: CDI without erythema or discharge. Non-tender.    PROCEDURES AND IMAGING:   Results for orders placed or performed during the hospital encounter of 01/11/20 (from the past 24  hour(s))   EKG 12 lead   Result Value Ref Range    Interpretation ECG Click View Image link to view waveform and result    CBC with platelets differential   Result Value Ref Range    WBC 7.2 4.0 - 11.0 10e9/L    RBC Count 4.77 4.4 - 5.9 10e12/L    Hemoglobin 14.3 13.3 - 17.7 g/dL    Hematocrit 41.8 40.0 - 53.0 %    MCV 88 78 - 100 fl    MCH 30.0 26.5 - 33.0 pg    MCHC 34.2 31.5 - 36.5 g/dL    RDW 14.0 10.0 - 15.0 %    Platelet Count 177 150 - 450 10e9/L    Diff Method Automated Method     % Neutrophils 51.0 %    % Lymphocytes 33.9 %    % Monocytes 8.0 %    % Eosinophils 6.0 %    % Basophils 0.7 %    % Immature Granulocytes 0.4 %    Nucleated RBCs 0 0 /100    Absolute Neutrophil 3.7 1.6 - 8.3 10e9/L    Absolute Lymphocytes 2.5 0.8 - 5.3 10e9/L    Absolute Monocytes 0.6 0.0 - 1.3 10e9/L    Absolute Eosinophils 0.4 0.0 - 0.7 10e9/L    Absolute Basophils 0.1 0.0 - 0.2 10e9/L    Abs Immature Granulocytes 0.0 0 - 0.4 10e9/L    Absolute Nucleated RBC 0.0    Comprehensive metabolic panel   Result Value Ref Range    Sodium 141 133 - 144 mmol/L    Potassium 3.5 3.4 - 5.3 mmol/L    Chloride 105 94 - 109 mmol/L    Carbon Dioxide 28 20 - 32 mmol/L    Anion Gap 8 3 - 14 mmol/L    Glucose 126 (H) 70 - 99 mg/dL    Urea Nitrogen 16 7 - 30 mg/dL    Creatinine 0.73 0.66 - 1.25 mg/dL    GFR Estimate >90 >60 mL/min/[1.73_m2]    GFR Estimate If Black >90 >60 mL/min/[1.73_m2]    Calcium 8.0 (L) 8.5 - 10.1 mg/dL    Bilirubin Total 0.6 0.2 - 1.3 mg/dL    Albumin 4.0 3.4 - 5.0 g/dL    Protein Total 7.8 6.8 - 8.8 g/dL    Alkaline Phosphatase 81 40 - 150 U/L    ALT 34 0 - 70 U/L    AST 28 0 - 45 U/L   Troponin I   Result Value Ref Range    Troponin I ES <0.015 0.000 - 0.045 ug/L   XR Chest 2 Views    Narrative    EXAM: CHEST 2 VIEWS  LOCATION: [facility]  DATE/TIME: 1/12/2020 12:01 AM    INDICATION: Chest pain.  COMPARISON: 11/11/2017.    FINDINGS: The lungs are clear. Normal size cardiac silhouette.      Impression     IMPRESSION: No evidence of active cardiopulmonary disease.    Troponin I   Result Value Ref Range    Troponin I ES <0.015 0.000 - 0.045 ug/L   Glucose by meter   Result Value Ref Range    Glucose 107 (H) 70 - 99 mg/dL   Troponin I   Result Value Ref Range    Troponin I ES <0.015 0.000 - 0.045 ug/L   Glucose by meter   Result Value Ref Range    Glucose 105 (H) 70 - 99 mg/dL   Dobutamine Stress Echocardiogram    Narrative    [id number]  [id number]  [id number]  [id number]^MARRY^JAY JAY^SHELDON           [facility],[place]  Echocardiography Laboratory  [address] [place] [address]     Name: WILFREDO COLBERT  MRN: [id number]  : 1955  Study Date: 2020 10:53 AM  Age: 64 yrs  Gender: Male  Patient Location: [facility] [place]  Reason For Study: Chest Pain  Ordering Physician: JAY JAY TUTTLE  Performed By: Claudia Martinez RDCS     BSA: 1.9 m2  Height: 67 in  Weight: 170 lb  BP: 154/89 mmHg  _____________________________________________________________________________  __        Procedure  Stress Echo Dobutamine Adult. Contrast Definity.  _____________________________________________________________________________  __        Interpretation Summary  Normal, low-risk dobutamine stress echocardiogram.  The target heart rate was achieved. Normal heart rate response to dobutamine.  BP declined with dobutamine infusion compared to baseline.  Normal biventricular size, thickness, and global systolic function at  baseline, LVEF=55-60%.  With dobutamine, LVEF increased to >70% and LV cavity size decreased  appropriately.  No regional wall motion abnormalities are present at rest or with dobutamine.  No angina was elicited.  No ECG evidence of ischemia/arrhythmia.  No significant valvular abnormalities are noted on screening Doppler exam.  The aortic root and visualized ascending aorta are normal.  _____________________________________________________________________________  __      Stress  Definity (NDC #49232-866-52) given intravenously.  Patient was given 9ml mixture of 1.5ml Definity and 8.5ml saline.  1 ml wasted.  Target Heart Rate was achieved.  The maximum dose of dobutamine was 40mcg/kg/min.  The maximum dose of atropine was 0.4mg.  The maximum dose of metoprolol was 3mg.  The patient did not exhibit any symptoms during drug infusion.  Normal blood pressure response with stress.     Stress Results                                       Maximum Predicted HR:   158 bpm             Target HR: 134 bpm        % Maximum Predicted HR: 87 %                             StageDurationHeart Rate  BP                                (mm:ss)   (bpm)                           REST  0:00      63    154/89                           PEAK  10:21     138   109/67                            Stress Duration:   10:21 mm:ss                      Maximum Stress HR: 138 bpm     Aortic Valve  The aortic valve is trileaflet.     _____________________________________________________________________________  __        Doppler Measurements & Calculations  MV E max gercia: 77.5 cm/sec  MV A max grecia: 86.4 cm/sec  MV E/A: 0.90  MV dec time: 0.18 sec  TR max grecia: 233.5 cm/sec  TR max P.8 mmHg              _____________________________________________________________________________  __        Report approved by: Stevenson DAI 2020 11:48 AM        DISCHARGE MEDICATIONS:   Current Discharge Medication List      CONTINUE these medications which have NOT CHANGED    Details   atorvastatin (LIPITOR) 20 MG tablet Take 20 mg by mouth daily      lisinopril (PRINIVIL/ZESTRIL) 10 MG tablet Take 10 mg by mouth daily      magnesium hydroxide (MILK OF MAGNESIA) 400 MG/5ML suspension Take 15 mLs by mouth daily      Melatonin 5 MG CHEW 5 mg by Oral or Feeding Tube route as needed      metFORMIN (GLUCOPHAGE-XR) 500 MG 24 hr tablet Take 500 mg by mouth daily               CONSULTATIONS:   Consultation during this admission  "received from:  N/A    BRIEF HISTORY OF PRESENT ILLNESS:   (Adopted from admission H&P).     Per ED,\"Lei Dickens is a 64 year old male with a past medical history notable for hypertension, hypercholesterolemia, and prediabetes who presents to the Emergency Department for complaints of chest pain and high blood pressure.  Patient states he was seen by his PCP earlier this week where he was told that he has high blood pressure and high cholesterol.  States he was prescribed BP medication, high cholesterol medication, and prediabetes medication.  Today, patient states he took his BP medication this morning at 8 AM noting, his BP was at 140. He reports of checking his BP throughout the day about 10 times where it has been increasing.  States when he last checked his BP today, it was at 180/100 which was worse than is ever been.    Patient reports of having chest pain earlier today as well with quality of pain as \"sharp\".  Per patient, states his chest pain has subsided and denies pain here in the ED.  States when he had chest pain, it lasted about 3 to 4 minutes.  He endorses having headache as well.  He denies history of stress test.\"    ED OBSERVATION COURSE: Lei Dickens is a 64 year old male with a PMH hypertension, hypercholesterolemia, and prediabetes who presented to the Emergency Department for complaints of chest pain and high blood pressure.      ##Chest pain:  ##Hypertension:  Pain resolved, blood pressure improved. Presented with intermittent chest pain for the last few days. The chest pain is sharp and lasts for 3-4 minutes at a time. He also notes that his blood pressure has been up all day today, at one point as high as 180 systolic. Denies SOB. No hypoxia. He denies diaphoresis, or nausea/vomiting. Troponin negative x3. . Chest xray shows No evidence of active cardiopulmonary disease. EKG is non-ischemic although there is a small Q wave in Lead III and aVF. Chest pain " resolved in the ED. Patient has multiple cardiac risk factors, including uncontrolled hypertension, hyperlipidemia, and diabetes. Pt has never undergone a stress test. Stress test this am is normal. Instructed him to keep a journal of his blood pressure and follow-up with PCP early next. Resume home Lisinopril. Consider another agent as out-patient. He was instructed to return to the ED with fever, uncontrolled nausea, vomiting, unrelieved pain, bleeding not relieved with pressure, dizziness, chest pain, shortness of breath, loss of consciousness, and any new or concerning symptoms.          ##DM2: Pt reports he took metformin for a while then his Hgb A1c was 5.1 so his primary care provider let him stop taking it. But his most recent visit in the last week revealed that his Hgb A1c is up above 6 again. He was restarted on metformin.     #HLD: continue PTA simvastatin       DISCHARGE DISPOSITION:   Discharged to home. The patient was discharged in a stable condition and agreed to discharge plan.    DISCHARGE INSTRUCTIONS AND FOLLOW-UP:  Discharge Procedure Orders   Reason for your hospital stay   Order Comments: You were admitted to the observation unit for evaluation of your chest pain.  Your EKG was normal, labs checking for heart damage were negative, chest x-ray was normal.  You underwent a stress test and this was normal.    Your blood pressure was elevated. We are not sure what caused this. However, it did improve with your home medication. Please monitor your blood pressure and follow-up with your primary care doctor early next week.     Adult [facility] Follow-up and recommended labs and tests   Order Comments: Follow up with primary care provider, [facility], within 7 days for hospital follow- up.  The following labs/tests are recommended: for hospital follow-up    Appointments on [place] and/or [facility] (with [facility] or [facility] provider or service). Call [phone] if you  haven't heard regarding these appointments within 7 days of discharge.     Activity   Order Comments: Your activity upon discharge: activity as tolerated     Order Specific Question Answer Comments   Is discharge order? Yes      When to contact your care team   Order Comments: Return to the ED with fever, uncontrolled nausea, vomiting, unrelieved pain, bleeding not relieved with pressure, dizziness, chest pain, shortness of breath, loss of consciousness, and any new or concerning symptoms.      Please go to your nearest emergency room If you were to have a change in the type of chest pain i.e more severe, lasting longer or radiating to your shoulder, arm, neck, jaw or back, shortness of breath or increased pain with breathing, coughing up blood, feel dizzy or lightheaded, or notice swelling in one leg.     Monitor and record   Order Comments: blood pressure daily, notify clinic if top number is > 140 or < 105     Diet   Order Comments: Follow this diet upon discharge: As tolerated     Order Specific Question Answer Comments   Is discharge order? Yes       Attestation:   I have reviewed today's vital signs, notes, medications, labs and imaging.      PENELOPE Hargrove, CNP  Emergency Department Observation Unit

## 2020-01-12 NOTE — PROGRESS NOTES
Observation goals PRIOR TO DISCHARGE     Comments: List all goals to be met before discharge home:   - Serial troponins and stress test complete: Trops negative x 2  .   - Seen and cleared by consultant if applicable: No     - Adequate pain control on oral analgesia: Yes,      - Vital signs normal or at patient baseline: No, BP trending down.     - Safe disposition plan has been identified:Yes,potentially to discharge to prior living arrangement once cardiac workup complete.  The Pt is admitted with cp and elevated BP.On admission to the unit /96. Recheck 147/87. PRN Hydralazine not administered. Blood sugar 107.A/O, independent and voiding spontaneously. Denies cp presently. Tele applied. NPO, Dobutamine Stress Test in AM. Oriented to room and call light within reach.    - Nurse to notify provider when observation goals have been met and patient is ready for discharge.

## 2020-01-12 NOTE — PROGRESS NOTES
S:patient pain free. Blood pressure better today without medications. No other complaints.  O:/83 (BP Location: Left arm)   Pulse 57   Temp 97.9  F (36.6  C) (Oral)   Resp 16   Wt 77.2 kg (170 lb 3.1 oz)   SpO2 100%   BMI 26.66 kg/m    Patient pleasant no distress neuro neg. HEENT neg neck without jvd cv rrr lung cta ab neg ext neg edema    Results for orders placed or performed during the hospital encounter of 01/11/20   CBC with platelets differential     Status: None   Result Value Ref Range    WBC 7.2 4.0 - 11.0 10e9/L    RBC Count 4.77 4.4 - 5.9 10e12/L    Hemoglobin 14.3 13.3 - 17.7 g/dL    Hematocrit 41.8 40.0 - 53.0 %    MCV 88 78 - 100 fl    MCH 30.0 26.5 - 33.0 pg    MCHC 34.2 31.5 - 36.5 g/dL    RDW 14.0 10.0 - 15.0 %    Platelet Count 177 150 - 450 10e9/L    Diff Method Automated Method     % Neutrophils 51.0 %    % Lymphocytes 33.9 %    % Monocytes 8.0 %    % Eosinophils 6.0 %    % Basophils 0.7 %    % Immature Granulocytes 0.4 %    Nucleated RBCs 0 0 /100    Absolute Neutrophil 3.7 1.6 - 8.3 10e9/L    Absolute Lymphocytes 2.5 0.8 - 5.3 10e9/L    Absolute Monocytes 0.6 0.0 - 1.3 10e9/L    Absolute Eosinophils 0.4 0.0 - 0.7 10e9/L    Absolute Basophils 0.1 0.0 - 0.2 10e9/L    Abs Immature Granulocytes 0.0 0 - 0.4 10e9/L    Absolute Nucleated RBC 0.0    Comprehensive metabolic panel     Status: Abnormal   Result Value Ref Range    Sodium 141 133 - 144 mmol/L    Potassium 3.5 3.4 - 5.3 mmol/L    Chloride 105 94 - 109 mmol/L    Carbon Dioxide 28 20 - 32 mmol/L    Anion Gap 8 3 - 14 mmol/L    Glucose 126 (H) 70 - 99 mg/dL    Urea Nitrogen 16 7 - 30 mg/dL    Creatinine 0.73 0.66 - 1.25 mg/dL    GFR Estimate >90 >60 mL/min/[1.73_m2]    GFR Estimate If Black >90 >60 mL/min/[1.73_m2]    Calcium 8.0 (L) 8.5 - 10.1 mg/dL    Bilirubin Total 0.6 0.2 - 1.3 mg/dL    Albumin 4.0 3.4 - 5.0 g/dL    Protein Total 7.8 6.8 - 8.8 g/dL    Alkaline Phosphatase 81 40 - 150 U/L    ALT 34 0 - 70 U/L    AST 28 0 - 45  U/L   Troponin I     Status: None   Result Value Ref Range    Troponin I ES <0.015 0.000 - 0.045 ug/L   Troponin I     Status: None   Result Value Ref Range    Troponin I ES <0.015 0.000 - 0.045 ug/L   Troponin I     Status: None   Result Value Ref Range    Troponin I ES <0.015 0.000 - 0.045 ug/L   Glucose by meter     Status: Abnormal   Result Value Ref Range    Glucose 107 (H) 70 - 99 mg/dL   Glucose by meter     Status: Abnormal   Result Value Ref Range    Glucose 105 (H) 70 - 99 mg/dL   EKG 12 lead     Status: None (Preliminary result)   Result Value Ref Range    Interpretation ECG Click View Image link to view waveform and result      XR Chest 2 Views   Final Result   IMPRESSION: No evidence of active cardiopulmonary disease.       Dobutamine Stress Echocardiogram    (Results Pending)       A:CP resolved     HTN improved  P:dobutamine stress and eval  If neg OK home with labs neg etc.

## 2020-01-12 NOTE — ED NOTES
Observation Brochure and Video    Patient informed of observation status based on provider's order.  Observation brochure was given and the video watched. Patient/Family stated understanding. Questions answered.  Fiordaliza Shay RN

## 2020-01-12 NOTE — PROGRESS NOTES
DC instructions given to pt, verbalized understanding.  All belongings with pt, IV DC'd and documented. Pt discharged.

## 2020-01-12 NOTE — ED NOTES
[facility] [place]   ED Nurse to Floor Handoff     Lei Dickens is a 64 year old male who speaks German and lives with family members,  in a home  They arrived in the ED by car from home    ED Chief Complaint: Hypertension (has been to doctor for high blood pressure, today it is really bad. He has had some chest pain with this high blood pressure and today his chest pain seemed a bit worse. )    ED Dx;   Final diagnoses:   Chest pain, unspecified type         Needed?: No    Allergies: No Known Allergies.  Past Medical Hx:   Past Medical History:   Diagnosis Date     Hypercholesterolemia      Hypertension      Pre-diabetes       Baseline Mental status: WDL  Current Mental Status changes: at basesline    Infection present or suspected this encounter: no  Sepsis suspected: No  Isolation type: No active isolations     Activity level - Baseline/Home:  Independent  Activity Level - Current:   Independent    Bariatric equipment needed?: No    In the ED these meds were given: Medications - No data to display    Drips running?  No    Home pump  No    Current LDAs       Labs results:   Labs Ordered and Resulted from Time of ED Arrival Up to the Time of Departure from the ED   COMPREHENSIVE METABOLIC PANEL - Abnormal; Notable for the following components:       Result Value    Glucose 126 (*)     Calcium 8.0 (*)     All other components within normal limits   CBC WITH PLATELETS DIFFERENTIAL   TROPONIN I   PERIPHERAL IV CATHETER       Imaging Studies:   Recent Results (from the past 24 hour(s))   XR Chest 2 Views    Narrative    EXAM: CHEST 2 VIEWS  LOCATION: [facility]  DATE/TIME: 1/12/2020 12:01 AM    INDICATION: Chest pain.  COMPARISON: 11/11/2017.    FINDINGS: The lungs are clear. Normal size cardiac silhouette.      Impression    IMPRESSION: No evidence of active cardiopulmonary disease.        Recent vital signs:   BP (!) 145/94   Pulse 59   Temp  96.7  F (35.9  C) (Oral)   Resp 13   Wt 78.5 kg (173 lb)   SpO2 97%   BMI 27.10 kg/m      Juliana Coma Scale Score: 15 (01/11/20 2250)       Cardiac Rhythm: Normal Sinus  Pt needs tele? Yes  Skin/wound Issues: None    Code Status: Full Code    Pain control: good    Nausea control: good    Abnormal labs/tests/findings requiring intervention:     Family present during ED course? No   Family Comments/Social Situation comments:     Tasks needing completion: None    Fiordaliza Shay RN  [facility] --   8-2014 [facility]  [phone] [facility]

## 2020-01-12 NOTE — PROGRESS NOTES
[facility]  Daily Progress Note          Assessment & Plan:   Lei Dickens is a 64 year old male with a PMH hypertension, hypercholesterolemia, and prediabetes who presented to the Emergency Department for complaints of chest pain and high blood pressure.      ##Chest pain:  ##Hypertension:  Pain resolved, blood pressure improved. Presented with intermittent chest pain for the last few days. The chest pain is sharp and lasts for 3-4 minutes at a time. He also notes that his blood pressure has been up all day today, at one point as high as 180 systolic. Denies SOB. No hypoxia. He denies diaphoresis, or nausea/vomiting. Troponin negative x3. . Chest xray shows No evidence of active cardiopulmonary disease. EKG is non-ischemic although there is a small Q wave in Lead III and aVF. Chest pain resolved in the ED. Patient has multiple cardiac risk factors, including uncontrolled hypertension, hyperlipidemia, and diabetes. Pt has never undergone a stress test.   -Rolla to ED Observation  -VS Q4hrs  -Continuous cardiac monitoring  -Dobutamine stress test this am   -Resume home Lisinopril. Consider another agent as out-patient      ##DM2: Pt reports he took metformin for a while then his Hgb A1c was 5.1 so his primary care provider let him stop taking it. But his most recent visit in the last week revealed that his Hgb A1c is up above 6 again. He was restarted on metformin. Hold for now as pt is NPO.     #HLD: continue PTA simvastatin    FEN: NPO for stress test  Lines: PIV  Prophylaxis: Early ambulation          Consults:   N/A         Discharge Planning:   Pending stress test results, if negative can discharge later today         Interval History:   Denies chest pain/pressure. Feels back to baseline.     ROS:   Constitutional: No fevers/chills. Tolerating diet.   Cardiovascular: No chest pain or palpitations.   Respiratory: No cough or SOB.   GI: No abdominal pain.  No N/V.      : No urinary complaints.   Musculoskeletal: Denies pain.           Physical Exam:   /83 (BP Location: Left arm)   Pulse 57   Temp 97.9  F (36.6  C) (Oral)   Resp 16   Wt 77.2 kg (170 lb 3.1 oz)   SpO2 100%   BMI 26.66 kg/m       GENERAL: Alert and oriented x 3. NAD.   HEENT: Anicteric sclera. Mucous membranes moist.   CV: RRR. S1, S2. No murmurs appreciated.   RESPIRATORY: Effort normal. Lungs CTAB with no wheezing, rales, rhonchi.   GI: Abdomen soft and non distended with normoactive bowel sounds present in all quadrants. No tenderness, rebound, guarding.   NEUROLOGICAL: No focal deficits. Moves all extremities.    EXTREMITIES: No peripheral edema. Intact bilateral pedal pulses.   SKIN: No jaundice. No rashes.     Medication list reviewed.   Today's labs and imaging were reviewed.     PENELOPE Hargrove, CNP  Emergency Department Observation Unit

## 2020-01-12 NOTE — PROGRESS NOTES
Observation goals PRIOR TO DISCHARGE     Comments: List all goals to be met before discharge home:   - Serial troponins and stress test complete: Trops negative, stress test today  .   - Seen and cleared by consultant if applicable: No     - Adequate pain control on oral analgesia: Yes     - Vital signs normal or at patient baseline:Yes   - Safe disposition plan has been identified:Yes,potentially to discharge to prior living arrangement once cardiac workup complete.    - Nurse to notify provider when observation goals have been met and patient is ready for discharge.  /83 (BP Location: Left arm)   Pulse 57   Temp 97.9  F (36.6  C) (Oral)   Resp 16   Wt 77.2 kg (170 lb 3.1 oz)   SpO2 100%   BMI 26.66 kg/m

## 2020-01-13 LAB — INTERPRETATION ECG - MUSE: NORMAL

## 2020-07-14 ENCOUNTER — TELEPHONE (OUTPATIENT)
Dept: INFECTIOUS DISEASES | Facility: CLINIC | Age: 65
End: 2020-07-14

## 2020-07-14 NOTE — TELEPHONE ENCOUNTER
[facility] Call Center    Phone Message    May a detailed message be left on voicemail: yes     Reason for Call: Appointment Intake    Referring Provider Name: Self referring   Diagnosis and/or Symptoms: Patient was traveling to [place] a couple years ago and got bit on his leg by a bug. 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 Patient have not been seen anywhere for this. Please call patient to advise. Thank you.     Action Taken: Message routed to:  [facility] ([facility]): ID    Travel Screening: Not Applicable

## 2020-08-12 NOTE — TELEPHONE ENCOUNTER
Called pt to offer him apt in ID clinic however pt reports he was able to get apt at different ID facility sooner.  Leanne Alejandro RN

## 2021-06-11 ENCOUNTER — HOSPITAL ENCOUNTER (OUTPATIENT)
Dept: NUCLEAR MEDICINE | Facility: CLINIC | Age: 66
Setting detail: NUCLEAR MEDICINE
End: 2021-06-11
Attending: ORTHOPAEDIC SURGERY
Payer: COMMERCIAL

## 2021-06-11 DIAGNOSIS — M25.539 WRIST PAIN: ICD-10-CM

## 2021-06-11 PROCEDURE — 343N000001 HC RX 343: Performed by: ORTHOPAEDIC SURGERY

## 2021-06-11 PROCEDURE — 78315 BONE IMAGING 3 PHASE: CPT

## 2021-06-11 PROCEDURE — A9503 TC99M MEDRONATE: HCPCS | Performed by: ORTHOPAEDIC SURGERY

## 2021-06-11 RX ORDER — TC 99M MEDRONATE 20 MG/10ML
25 INJECTION, POWDER, LYOPHILIZED, FOR SOLUTION INTRAVENOUS ONCE
Status: COMPLETED | OUTPATIENT
Start: 2021-06-11 | End: 2021-06-11

## 2021-06-11 RX ADMIN — TC 99M MEDRONATE 25.9 MCI.: 20 INJECTION, POWDER, LYOPHILIZED, FOR SOLUTION INTRAVENOUS at 09:37

## 2022-10-07 NOTE — PROGRESS NOTES
Observation goals PRIOR TO DISCHARGE     Comments: List all goals to be met before discharge home:   - Serial troponins and stress test complete: Yes  - Seen and cleared by consultant if applicable: Yes     - Adequate pain control on oral analgesia: Yes     - Vital signs normal or at patient baseline:Yes   - Safe disposition plan has been identified:Yes  - Nurse to notify provider when observation goals have been met and patient is ready for discharge.  BP (!) 140/94 (BP Location: Left arm)   Pulse 57   Temp 98.1  F (36.7  C) (Oral)   Resp 16   Wt 77.2 kg (170 lb 3.1 oz)   SpO2 99%   BMI 26.66 kg/m            Recognize danger situations.  For example, stress, drinking alcohol, urges to smoke, smoking cues, cigarette availability

## (undated) RX ORDER — METOPROLOL TARTRATE 1 MG/ML
INJECTION, SOLUTION INTRAVENOUS
Status: DISPENSED
Start: 2020-01-12

## (undated) RX ORDER — DOBUTAMINE HYDROCHLORIDE 200 MG/100ML
INJECTION INTRAVENOUS
Status: DISPENSED
Start: 2020-01-12

## (undated) RX ORDER — ATROPINE SULFATE 0.4 MG/ML
AMPUL (ML) INJECTION
Status: DISPENSED
Start: 2020-01-12